# Patient Record
Sex: MALE | Race: WHITE | NOT HISPANIC OR LATINO | Employment: OTHER | URBAN - METROPOLITAN AREA
[De-identification: names, ages, dates, MRNs, and addresses within clinical notes are randomized per-mention and may not be internally consistent; named-entity substitution may affect disease eponyms.]

---

## 2021-04-09 ENCOUNTER — TRANSFERRED RECORDS (OUTPATIENT)
Dept: HEALTH INFORMATION MANAGEMENT | Facility: CLINIC | Age: 71
End: 2021-04-09

## 2021-06-21 ENCOUNTER — TRANSFERRED RECORDS (OUTPATIENT)
Dept: HEALTH INFORMATION MANAGEMENT | Facility: CLINIC | Age: 71
End: 2021-06-21

## 2021-07-06 ENCOUNTER — TRANSFERRED RECORDS (OUTPATIENT)
Dept: HEALTH INFORMATION MANAGEMENT | Facility: CLINIC | Age: 71
End: 2021-07-06

## 2021-07-07 ENCOUNTER — MEDICAL CORRESPONDENCE (OUTPATIENT)
Dept: HEALTH INFORMATION MANAGEMENT | Facility: CLINIC | Age: 71
End: 2021-07-07

## 2021-07-16 ENCOUNTER — TELEPHONE (OUTPATIENT)
Dept: GASTROENTEROLOGY | Facility: CLINIC | Age: 71
End: 2021-07-16

## 2021-07-16 ENCOUNTER — TRANSCRIBE ORDERS (OUTPATIENT)
Dept: OTHER | Age: 71
End: 2021-07-16

## 2021-07-16 DIAGNOSIS — K55.21 ANGIODYSPLASIA OF SMALL INTESTINE, EXCEPT DUODENUM WITH BLEEDING: Primary | ICD-10-CM

## 2021-07-16 NOTE — TELEPHONE ENCOUNTER
[facility] Call Center    Phone Message    May a detailed message be left on voicemail: yes     Reason for Call: Other: Patient called to schedule appt for DX: DI Bleeding from URGENT referral of Mitch Resendez NP.  Please review and follow up with patient.  Thank you!     Action Taken: Message routed to:  [facility]    Travel Screening: Not Applicable

## 2021-07-16 NOTE — TELEPHONE ENCOUNTER
REFERRAL INFORMATION:    Referring Provider:  Deepika Resendez NP     Referring Clinic: [facility]     Reason for Visit/Diagnosis: GI Bleeding     FUTURE VISIT INFORMATION:    Appointment Date: 11/2/2021    Appointment Time: 3 PM      NOTES STATUS DETAILS   OFFICE NOTE from Referring Provider Care Everywhere 7/15/2021, 7/6/2021, 6/30/2021 Office visit with BLANCA Constantino CNP     OFFICE NOTE from Other Specialist Care Everywhere 6/25/2021 Office visit with Dr. Kelley Tellez ([facility])     HOSPITAL DISCHARGE SUMMARY/  ED VISITS N/A    OPERATIVE REPORT N/A    MEDICATION LIST N/A         ENDOSCOPY  Care Everywhere EGD: 10/22/2020 ([facility])    COLONOSCOPY Care Everywhere 10/22/2020 ([facility])    ERCP N/A    EUS N/A    STOOL TESTING Care Everywhere 5/20/2021   PERTINENT LABS Care Everywhere    PATHOLOGY REPORTS (RELATED) Care Everywhere 10/22/2020   IMAGING (CT, MRI, EGD, MRCP, Small Bowel Follow Through/SBT, MR/CT Enterography) N/A

## 2021-07-19 NOTE — TELEPHONE ENCOUNTER
Patient moved up due to urgency. Currently managed by [facility] out of [facility].

## 2021-08-16 ENCOUNTER — OFFICE VISIT (OUTPATIENT)
Dept: GASTROENTEROLOGY | Facility: CLINIC | Age: 71
End: 2021-08-16
Payer: MEDICAID

## 2021-08-16 VITALS
TEMPERATURE: 99 F | WEIGHT: 252.6 LBS | HEART RATE: 82 BPM | BODY MASS INDEX: 38.28 KG/M2 | OXYGEN SATURATION: 98 % | HEIGHT: 68 IN | DIASTOLIC BLOOD PRESSURE: 64 MMHG | SYSTOLIC BLOOD PRESSURE: 136 MMHG

## 2021-08-16 DIAGNOSIS — K55.21 ANGIODYSPLASIA OF SMALL INTESTINE, EXCEPT DUODENUM WITH BLEEDING: Primary | ICD-10-CM

## 2021-08-16 PROCEDURE — 99204 OFFICE O/P NEW MOD 45 MIN: CPT | Performed by: INTERNAL MEDICINE

## 2021-08-16 RX ORDER — LANOLIN ALCOHOL/MO/W.PET/CERES
400 CREAM (GRAM) TOPICAL
COMMUNITY

## 2021-08-16 RX ORDER — BUMETANIDE 1 MG/1
TABLET ORAL
COMMUNITY
Start: 2021-08-06

## 2021-08-16 RX ORDER — FERROUS SULFATE 325(65) MG
325 TABLET ORAL
COMMUNITY
Start: 2020-10-23

## 2021-08-16 RX ORDER — ASCORBIC ACID 500 MG
500 TABLET ORAL
COMMUNITY

## 2021-08-16 RX ORDER — TIOTROPIUM BROMIDE 18 UG/1
CAPSULE ORAL; RESPIRATORY (INHALATION)
COMMUNITY
Start: 2021-06-26

## 2021-08-16 RX ORDER — ALBUTEROL SULFATE 90 UG/1
AEROSOL, METERED RESPIRATORY (INHALATION)
COMMUNITY
Start: 2021-08-09

## 2021-08-16 RX ORDER — FUROSEMIDE 20 MG
20 TABLET ORAL DAILY
COMMUNITY
Start: 2021-05-03

## 2021-08-16 RX ORDER — TAMSULOSIN HYDROCHLORIDE 0.4 MG/1
CAPSULE ORAL
COMMUNITY
Start: 2021-08-04

## 2021-08-16 RX ORDER — AMLODIPINE BESYLATE 5 MG/1
TABLET ORAL
COMMUNITY
Start: 2021-08-06

## 2021-08-16 RX ORDER — HYDRALAZINE HYDROCHLORIDE 50 MG/1
TABLET, FILM COATED ORAL
COMMUNITY
Start: 2021-08-04

## 2021-08-16 RX ORDER — FLUOXETINE 40 MG/1
CAPSULE ORAL
COMMUNITY
Start: 2021-08-06

## 2021-08-16 ASSESSMENT — MIFFLIN-ST. JEOR: SCORE: 1870.29

## 2021-08-16 NOTE — LETTER
8/16/2021         RE: Emmett Omer  [address]  [address] [facility] [address]        Dear Colleague,    Thank you for referring your patient, Emmett Omer, to the [facility] Center Line. Please see a copy of my visit note below.    [facility] Gastroenterology Consultation -  [facility]    Provider: Arpit Graff MD    PCP: Deepika Resendez NP    Assessment:  70-year-old with stage IV kidney disease possible IgA nephropathy with chronic iron deficiency anemia which worsens at times.  Recent upper endoscopy and colonoscopy did not reveal a source for chronic iron deficiency.  He recently had a capsule enteroscopy which apparently showed 2 angiodysplasias no other details available.  According to the patient these were oozing blood.  Colonoscopy demonstrated a 25 mm tubulovillous adenoma with high-grade dysplasia which was completely resected.    Recommendations:   Get outside capsule report and capsule images if possible ASAP  Tentatively schedule the patient for late August or early September to have a single balloon enteroscopy in the OR with Vladimir Gomez  Patient should have a follow-up colonoscopy at that time.    History of Present Illness:   This is a 70-year-old patient with with stage IV kidney disease associated with light chain proteinuria.  He was told this was hereditary so I assume he has been diagnosed with IgA nephropathy.  He has been iron deficient for over 3 years and more recently has been worked up for this since his hemoglobin is been dropping more precipitously at times from his baseline of around 9 down to around 7.  He is received over 11 transfusions of 2 packed cells each time.  He continues to drop his hemoglobin.  Upper endoscopy and colonoscopy did not reveal a source of blood loss.  The patient had a capsule enteroscopy which did reveal to angiectasia's in the small bowel somewhere.  I did not see the capsule report or any images.  Patient made a concerted effort  to try to get those medical records to us.  The patient suffers from chronic obstructive pulmonary disease obesity hypertension stage IV kidney disease chronic anemia.  His colonoscopy demonstrated a 2.5 cm villous adenoma with high-grade dysplasia that according to the report was completely resected.  He said he is due for another colonoscopy now.      He denies vomiting blood he denies ever seeing black stool prior to the diagnosis of the iron deficiency and being placed on iron.  Does not sound like he is receiving erythropoietin.  He says he has received B12 and this is seem to help a little bit.  He is struggling with quitting smoking all he has quit for some time he is not able to sustain it very long.     He denies abdominal pain constipation diarrhea ever turning yellow.  He has no family history of blood disorders.      Current Outpatient Medications   Medication Sig Dispense Refill     albuterol (PROAIR HFA/PROVENTIL HFA/VENTOLIN HFA) 108 (90 Base) MCG/ACT inhaler INHALE 2 PUFFS INTO THE LUNGS EVERY FOUR HOURS AS NEEDED FOR WHEEZING, SHORTNESS OF BREATH OR COUGH. SHAKE BEFORE USING.       amLODIPine (NORVASC) 5 MG tablet TAKE 2 TABLETS BY MOUTH ONCE DAILY       ANORO ELLIPTA 62.5-25 MCG/INH oral inhaler INHALE 1 PUFF BY MOUTH ONCE DAILY       aspirin (ASA) 81 MG EC tablet Take 81 mg by mouth       bumetanide (BUMEX) 1 MG tablet TAKE 1 TABLET BY MOUTH TWICE A DAY       ferrous sulfate (FEROSUL) 325 (65 Fe) MG tablet Take 325 mg by mouth       FLUoxetine (PROZAC) 40 MG capsule TAKE 1 CAPSULE BY MOUTH ONCE DAILY       folic acid (FOLVITE) 400 MCG tablet Take 400 mcg by mouth       furosemide (LASIX) 20 MG tablet Take 20 mg by mouth daily       hydrALAZINE (APRESOLINE) 50 MG tablet TAKE 1 TABLET BY MOUTH THREE TIMES DAILY       potassium 75 MG TABS Take 1 tablet by mouth every 48 hours       SPIRIVA HANDIHALER 18 MCG inhaled capsule INHALE 1 CAPSULE DAILY       tamsulosin (FLOMAX) 0.4 MG capsule TAKE 1 CAPSULE  BY MOUTH ONCE DAILY       vitamin B-12 (CYANOCOBALAMIN) 500 MCG tablet Take 500 mcg by mouth       vitamin C (ASCORBIC ACID) 500 MG tablet Take 500 mg by mouth     Problem list:  Iron deficiency anemia due to chronic blood loss 07/07/2021   Chronic bronchitis, unspecified chronic bronchitis type 07/07/2021   Stenosis of right carotid artery 06/07/2021   Class 2 severe obesity due to excess calories with serious comorbidity and body mass index (BMI) of 39.0 to 39.9 in adult 05/30/2021   Angiodysplasia of small intestine, except duodenum with bleeding 05/30/2021   Kidney disease with fluid retention 05/20/2021   Weeks's esophagus with high grade dysplasia 05/20/2021   Tubular adenoma with high-grade dysplasia 05/20/2021   Centrilobular emphysema 05/05/2021   Mucopurulent chronic bronchitis 04/29/2021   Kidney stones 03/31/2021   LVH (left ventricular hypertrophy) due to hypertensive disease, with heart failure 03/31/2021   Diastolic dysfunction 03/31/2021   CKD stage 4 due to type 2 diabetes mellitus 03/31/2021   Bilateral carotid bruits 03/31/2021   Snoring 03/31/2021   Type 2 diabetes mellitus with stage 4 chronic kidney disease, without long-term current use of insulin 03/31/2021   Basal cell carcinoma (BCC) of skin of left ear 01/16/2021   Anemia due to stage 4 chronic kidney disease 10/21/2020   Hyperlipidemia associated with type 2 diabetes mellitus 04/18/2017   Hypertension associated with diabetes 12/22/2015        Allergies   Allergen Reactions     Bee Venom Anaphylaxis     Levofloxacin Shortness Of Breath and Hives     Lisinopril Cough     Past Surgical History:   Procedure Laterality Date     COLONOSCOPY N/A 10/22/2020   repeat in one year, polyp with high grade dysplasia     DENTAL SURGERY     SKIN GRAFT   RLE to burn     UPPER GASTROINTESTINAL ENDOSCOPY N/A 10/22/2020   repeat in three years, barretts esophagus     Family History   Problem Relation Age of Onset     Other Mother   d. age 30 childbirth  "    Other Father   d. age 82 cance unknown type. all over body. radiation exposure in .     Other Brother   d. age 31 plane crash . hit bird. he ejected but hit rocks on shore.     No Known Problems Daughter     No Known Problems Son     No Known Problems Daughter     Social History     Tobacco Use     Smoking status: Former Smoker   Packs/day: 2.00   Years: 53.00   Pack years: 106.00   Types: Cigarettes   Start date: 5/10/1962   Quit date: 2021   Years since quittin.4     Smokeless tobacco: Never Used     Tobacco comment: on 21 mg nicotine patches   Vaping Use     Vaping Use: Never used   Substance Use Topics     Alcohol use: Yes   Alcohol/week: 1.0 standard drinks   Types: 1 Shots of liquor per week   Comment: \"Maybe a couple of beers a month\" [facility]     Drug use: No       reports that he has been smoking. He has never used smokeless tobacco. He reports previous alcohol use. He reports that he does not use drugs.    Physical Exam  Constitutional:       Appearance: Normal appearance. He is obese.   HENT:      Mouth/Throat:      Mouth: Mucous membranes are moist.   Eyes:      Extraocular Movements: Extraocular movements intact.      Pupils: Pupils are equal, round, and reactive to light.   Cardiovascular:      Rate and Rhythm: Normal rate.   Pulmonary:      Effort: Pulmonary effort is normal.   Abdominal:      Palpations: Abdomen is soft. There is no mass.      Tenderness: There is no abdominal tenderness.   Neurological:      Mental Status: He is alert.     Extremities: 3+ pitting edema bilateral pretibial area            Again, thank you for allowing me to participate in the care of your patient.        Sincerely,        Arpit Graff MD    "

## 2021-08-16 NOTE — NURSING NOTE
"Chief Complaint   Patient presents with     Consult     /64   Pulse 82   Temp 99  F (37.2  C) (Oral)   Ht 1.727 m (5' 8\")   Wt 114.6 kg (252 lb 9.6 oz)   SpO2 98%   BMI 38.41 kg/m   Estimated body mass index is 38.41 kg/m  as calculated from the following:    Height as of this encounter: 1.727 m (5' 8\").    Weight as of this encounter: 114.6 kg (252 lb 9.6 oz).      LUC Li  "

## 2021-08-16 NOTE — PATIENT INSTRUCTIONS
It was a pleasure taking care of you today. I've included a brief summary of our discussion and care plan from today's visit below.  Please review this information with your primary care provider.  _______________________________________________________________________    My recommendations are summarized as follows:    Recommendations:   Get outside capsule report and capsule images if possible ASAP  Tentatively schedule the patient for late August or early September to have a single balloon enteroscopy in the OR with Vladimir Saavedraau  Patient should have a follow-up colonoscopy at that time.    Return to GI Clinic in in 2 months.     If you need any follow-up appointments, please use the following phone numbers below.    To schedule or reschedule a follow-up GI appointment, call [phone] option 1    To schedule your endoscopy procedure, call [phone] option 2    To schedule imaging, please call [phone]     To schedule your lab appointment at [facility] 1st floor lab call [phone]. Call your [facility] lab directly if it is not [facility]. If you use a non-[facility] lab, please let us know where to fax your lab order (call Berenice at [phone]).      _______________________________________________________________________    Please be in touch if there are any further questions that arise following today's visit.  There are multiple ways to contact your gastroenterology care team.      During business hours, you may reach your gastroenterology RN Care Coordinator, Berenice Grier, at [phone].      You can always send a secure message through [employer]. [employer] messages are answered by your nurse or doctor typically within 24 hours. Please allow extra time on weekends and holidays.     What is Tech[employer]ith?  TechFaith is a secure way for you to access all of your healthcare records from the [facility].  It is a web based computer program, so you can sign on to it from any  location.  It also allows you to send secure messages to your care team.  I recommend signing up for [employer] access if you have not already done so and are comfortable with using a computer.     For urgent/emergent questions after business hours, you may reach the on-call GI Fellow by contacting the [facility]  at [phone].     How will I get the results of any tests ordered?    You will receive all of your results.  If you have signed up for [email or web address], any tests ordered at your visit will be available to you after your physician reviews them.  Typically this takes 1-2 weeks.  If there are urgent results that require a change in your care plan, your physician or nurse will call you to discuss the next steps.      Thank you for choosing [facility]!       Sincerely,    Arpit Graff MD  [facility]  Division of Gastroenterology

## 2021-08-17 ENCOUNTER — TELEPHONE (OUTPATIENT)
Dept: GASTROENTEROLOGY | Facility: CLINIC | Age: 71
End: 2021-08-17

## 2021-08-17 NOTE — TELEPHONE ENCOUNTER
Advanced Endoscopy     Referring provider:  Dr Arpit Graff    Referred to: [facility]     Provider Requested:  Dr. Gomez     Referral Received: 8/17/21     Records received: some in CareEverywhere     Images received: capsule study not received    Evaluation for: GIB- Angiodysplasia of small intestine, except duodenum with bleeding      Clinical History (per RN review):     Report and images requested from [facility], left message.      MD review date:   MD Decision for clinic consultation/Orders:            Referral updates/Patient contacted:  8/17- told pt were working on referral, requesting records/images, pt appreciative of call,   8-26, still have no records, updated patient. Called [facility] to get images/reports sent again. Endocapsule done 6/21 at [facility] per CE.     9/23- Deepika Nirav has printed off his capsule images due to all of the back and forth going on and she can mail them to us.   [phone]. Left message with address to mail images to.

## 2021-08-17 NOTE — PROGRESS NOTES
[facility] Gastroenterology Consultation [facility]    Provider: Arpit Graff MD    PCP: Deepika Resendez NP    Assessment:  70-year-old with stage IV kidney disease possible IgA nephropathy with chronic iron deficiency anemia which worsens at times.  Recent upper endoscopy and colonoscopy did not reveal a source for chronic iron deficiency.  He recently had a capsule enteroscopy which apparently showed 2 angiodysplasias no other details available.  According to the patient these were oozing blood.  Colonoscopy demonstrated a 25 mm tubulovillous adenoma with high-grade dysplasia which was completely resected.    Recommendations:   Get outside capsule report and capsule images if possible ASAP  Tentatively schedule the patient for late August or early September to have a single balloon enteroscopy in the OR with Vladimir Gomez  Patient should have a follow-up colonoscopy at that time.    History of Present Illness:   This is a 70-year-old patient with with stage IV kidney disease associated with light chain proteinuria.  He was told this was hereditary so I assume he has been diagnosed with IgA nephropathy.  He has been iron deficient for over 3 years and more recently has been worked up for this since his hemoglobin is been dropping more precipitously at times from his baseline of around 9 down to around 7.  He is received over 11 transfusions of 2 packed cells each time.  He continues to drop his hemoglobin.  Upper endoscopy and colonoscopy did not reveal a source of blood loss.  The patient had a capsule enteroscopy which did reveal to angiectasia's in the small bowel somewhere.  I did not see the capsule report or any images.  Patient made a concerted effort to try to get those medical records to us.  The patient suffers from chronic obstructive pulmonary disease obesity hypertension stage IV kidney disease chronic anemia.  His colonoscopy demonstrated a 2.5 cm villous adenoma with high-grade dysplasia that  according to the report was completely resected.  He said he is due for another colonoscopy now.      He denies vomiting blood he denies ever seeing black stool prior to the diagnosis of the iron deficiency and being placed on iron.  Does not sound like he is receiving erythropoietin.  He says he has received B12 and this is seem to help a little bit.  He is struggling with quitting smoking all he has quit for some time he is not able to sustain it very long.     He denies abdominal pain constipation diarrhea ever turning yellow.  He has no family history of blood disorders.      Current Outpatient Medications   Medication Sig Dispense Refill     albuterol (PROAIR HFA/PROVENTIL HFA/VENTOLIN HFA) 108 (90 Base) MCG/ACT inhaler INHALE 2 PUFFS INTO THE LUNGS EVERY FOUR HOURS AS NEEDED FOR WHEEZING, SHORTNESS OF BREATH OR COUGH. SHAKE BEFORE USING.       amLODIPine (NORVASC) 5 MG tablet TAKE 2 TABLETS BY MOUTH ONCE DAILY       ANORO ELLIPTA 62.5-25 MCG/INH oral inhaler INHALE 1 PUFF BY MOUTH ONCE DAILY       aspirin (ASA) 81 MG EC tablet Take 81 mg by mouth       bumetanide (BUMEX) 1 MG tablet TAKE 1 TABLET BY MOUTH TWICE A DAY       ferrous sulfate (FEROSUL) 325 (65 Fe) MG tablet Take 325 mg by mouth       FLUoxetine (PROZAC) 40 MG capsule TAKE 1 CAPSULE BY MOUTH ONCE DAILY       folic acid (FOLVITE) 400 MCG tablet Take 400 mcg by mouth       furosemide (LASIX) 20 MG tablet Take 20 mg by mouth daily       hydrALAZINE (APRESOLINE) 50 MG tablet TAKE 1 TABLET BY MOUTH THREE TIMES DAILY       potassium 75 MG TABS Take 1 tablet by mouth every 48 hours       SPIRIVA HANDIHALER 18 MCG inhaled capsule INHALE 1 CAPSULE DAILY       tamsulosin (FLOMAX) 0.4 MG capsule TAKE 1 CAPSULE BY MOUTH ONCE DAILY       vitamin B-12 (CYANOCOBALAMIN) 500 MCG tablet Take 500 mcg by mouth       vitamin C (ASCORBIC ACID) 500 MG tablet Take 500 mg by mouth     Problem list:  Iron deficiency anemia due to chronic blood loss 07/07/2021   Chronic  bronchitis, unspecified chronic bronchitis type 07/07/2021   Stenosis of right carotid artery 06/07/2021   Class 2 severe obesity due to excess calories with serious comorbidity and body mass index (BMI) of 39.0 to 39.9 in adult 05/30/2021   Angiodysplasia of small intestine, except duodenum with bleeding 05/30/2021   Kidney disease with fluid retention 05/20/2021   Weeks's esophagus with high grade dysplasia 05/20/2021   Tubular adenoma with high-grade dysplasia 05/20/2021   Centrilobular emphysema 05/05/2021   Mucopurulent chronic bronchitis 04/29/2021   Kidney stones 03/31/2021   LVH (left ventricular hypertrophy) due to hypertensive disease, with heart failure 03/31/2021   Diastolic dysfunction 03/31/2021   CKD stage 4 due to type 2 diabetes mellitus 03/31/2021   Bilateral carotid bruits 03/31/2021   Snoring 03/31/2021   Type 2 diabetes mellitus with stage 4 chronic kidney disease, without long-term current use of insulin 03/31/2021   Basal cell carcinoma (BCC) of skin of left ear 01/16/2021   Anemia due to stage 4 chronic kidney disease 10/21/2020   Hyperlipidemia associated with type 2 diabetes mellitus 04/18/2017   Hypertension associated with diabetes 12/22/2015        Allergies   Allergen Reactions     Bee Venom Anaphylaxis     Levofloxacin Shortness Of Breath and Hives     Lisinopril Cough     Past Surgical History:   Procedure Laterality Date     COLONOSCOPY N/A 10/22/2020   repeat in one year, polyp with high grade dysplasia     DENTAL SURGERY     SKIN GRAFT   RLE to burn     UPPER GASTROINTESTINAL ENDOSCOPY N/A 10/22/2020   repeat in three years, barretts esophagus     Family History   Problem Relation Age of Onset     Other Mother   d. age 30 childbirth     Other Father   d. age 82 cance unknown type. all over body. radiation exposure in .     Other Brother   d. age 31 plane crash . hit bird. he ejected but hit rocks on shore.     No Known Problems Daughter     No Known Problems  "Son     No Known Problems Daughter     Social History     Tobacco Use     Smoking status: Former Smoker   Packs/day: 2.00   Years: 53.00   Pack years: 106.00   Types: Cigarettes   Start date: 5/10/1962   Quit date: 2021   Years since quittin.4     Smokeless tobacco: Never Used     Tobacco comment: on 21 mg nicotine patches   Vaping Use     Vaping Use: Never used   Substance Use Topics     Alcohol use: Yes   Alcohol/week: 1.0 standard drinks   Types: 1 Shots of liquor per week   Comment: \"Maybe a couple of beers a month\" [place]     Drug use: No       reports that he has been smoking. He has never used smokeless tobacco. He reports previous alcohol use. He reports that he does not use drugs.    Physical Exam  Constitutional:       Appearance: Normal appearance. He is obese.   HENT:      Mouth/Throat:      Mouth: Mucous membranes are moist.   Eyes:      Extraocular Movements: Extraocular movements intact.      Pupils: Pupils are equal, round, and reactive to light.   Cardiovascular:      Rate and Rhythm: Normal rate.   Pulmonary:      Effort: Pulmonary effort is normal.   Abdominal:      Palpations: Abdomen is soft. There is no mass.      Tenderness: There is no abdominal tenderness.   Neurological:      Mental Status: He is alert.     Extremities: 3+ pitting edema bilateral pretibial area        "

## 2021-08-25 ENCOUNTER — PATIENT OUTREACH (OUTPATIENT)
Dept: GASTROENTEROLOGY | Facility: CLINIC | Age: 71
End: 2021-08-25

## 2021-08-25 NOTE — TELEPHONE ENCOUNTER
Returned call to Deepika from referring office, she wants to know when patient will be scheduled for endoscopy. Have not received capsule endoscopy images yet, per Dr. Gomez need to review those prior to scheduling patient for procedure. Left message with clinic number and fax #.    ML

## 2021-09-16 ENCOUNTER — PATIENT OUTREACH (OUTPATIENT)
Dept: GASTROENTEROLOGY | Facility: CLINIC | Age: 71
End: 2021-09-16

## 2021-09-16 NOTE — PROGRESS NOTES
Returned call to Deepika in response to call center message received,     Deepika Resendez NP, called to check in on when this patient will be getting scheduled. They are concerned since his HGB keeps dropping.   Deepika states that someone from [facility] told the patient that the capsule study was in the patient's record at [facility].     Message routed to Dr Gomez     I usually create a document with my interpretation and forward this to Michelle   I dont have any capsules yet to read    Will check with records dept to see if capsule study was received    If capsule study needs to be requested again Deepika says to call [phone] GI dept at [facility]    Lora Nelson, RN, BSN,   [facility]  Care coordinator

## 2021-10-07 ENCOUNTER — PATIENT OUTREACH (OUTPATIENT)
Dept: GASTROENTEROLOGY | Facility: CLINIC | Age: 71
End: 2021-10-07

## 2021-10-07 DIAGNOSIS — K55.21 ANGIODYSPLASIA OF SMALL INTESTINE, EXCEPT DUODENUM WITH BLEEDING: Primary | ICD-10-CM

## 2021-10-07 NOTE — TELEPHONE ENCOUNTER
Returned call to Deepika, referring NP    Pt w/ persistent GIB, Hgb 6.2 again today. We are having immense difficulty getting capsule study film or images here for our review. Referring provider anxious to get the plan moving, I explained the importance of getting capsule images prior to scheduling procedure.    Pt is agreeable to repeat capsule study down here- orders placed, message sent to scheduling, providers updated.    ML

## 2021-10-08 NOTE — TELEPHONE ENCOUNTER
Called referring provider, Deepika- explained capsule and procedure cannot be done on the same day. Per Dr. Gomez    Unfortunately no   We are doing the capsule to understand what to target   Capsule takes 8h to capture and then 24+ to interpret   Then the patient may need a prep for the endoscopy     Left message w/ Deepika of above.    ML

## 2021-10-12 NOTE — TELEPHONE ENCOUNTER
Called NP to see how long patient could consider being down here as we try to cluster and organize care. Pt needs repeat capsule study here followed by +/- enteroscopy with Dr. Gomez. Per Dr. Gomez    Capsule takes 8h to capture and then 24+ to interpret   Then the patient may need a prep for the endoscopy     Discussed plan with patient- coming for 5 days vs coming for 2 trips (capsule then procedure) He prefers coming for 2 trips. Message sent to endo scheduling to facilitate asap capsule study.    ML

## 2021-10-29 NOTE — TELEPHONE ENCOUNTER
Per Dr. Gomez  single balloon per os r/t GIB      Please assist in scheduling:     Procedure/Imaging/Clinic: enteroscopy  Physician: Dr. Gomez  Timin/10  Procedure length:50 min  Anesthesia:gen  Dx: GIB  Tier:2  Location: [facility]     Pt was getting blood transfusion, asked that I call him back later. Returned call.         Called to discuss with patient. Explained they can expect a call from  for date and time of procedure, will need a , someone to stay with them for 24 hours and should stay in town for 24 hours (within 45 min of Hospital) post procedure    Patient needs to get pre-op physical completed. If outside [facility] system will need physical faxed to number [phone]   If you do not get a preop physical, your procedure could be cancelled, patient voiced understanding*    Preop Plan:see preop note from 10-8    Med Review    Blood thinner -  no  ASA - no  Diabetic - no    COVID test discussed:will get done     Patient Education r/t procedure:done    Does patient have any history of gastric bypass/gastric surgery/altered panc/bili anatomy?no    A pre-op nurse will call 1-2 days prior to the procedure. I    NPO/Prep: CLD 24 hours day prior, 2 doses of Miralax night before, CLD ok until arrival    Other specific details/comments:none     Verbalized understanding of all instructions. All questions answered.          ML

## 2021-11-01 ENCOUNTER — PREP FOR PROCEDURE (OUTPATIENT)
Dept: GASTROENTEROLOGY | Facility: CLINIC | Age: 71
End: 2021-11-01

## 2021-11-01 DIAGNOSIS — K92.2 GIB (GASTROINTESTINAL BLEEDING): Primary | ICD-10-CM

## 2021-11-01 DIAGNOSIS — Z11.59 ENCOUNTER FOR SCREENING FOR OTHER VIRAL DISEASES: ICD-10-CM

## 2021-11-02 ENCOUNTER — PRE VISIT (OUTPATIENT)
Dept: GASTROENTEROLOGY | Facility: CLINIC | Age: 71
End: 2021-11-02

## 2021-11-03 ENCOUNTER — TRANSFERRED RECORDS (OUTPATIENT)
Dept: HEALTH INFORMATION MANAGEMENT | Facility: CLINIC | Age: 71
End: 2021-11-03

## 2021-11-05 ENCOUNTER — PATIENT OUTREACH (OUTPATIENT)
Dept: GASTROENTEROLOGY | Facility: CLINIC | Age: 71
End: 2021-11-05

## 2021-11-05 ENCOUNTER — PREP FOR PROCEDURE (OUTPATIENT)
Dept: GASTROENTEROLOGY | Facility: CLINIC | Age: 71
End: 2021-11-05
Payer: MEDICAID

## 2021-11-05 DIAGNOSIS — K92.2 GIB (GASTROINTESTINAL BLEEDING): Primary | ICD-10-CM

## 2021-11-05 RX ORDER — BISACODYL 5 MG/1
TABLET, DELAYED RELEASE ORAL
Qty: 4 TABLET | Refills: 0 | Status: SHIPPED | OUTPATIENT
Start: 2021-11-05 | End: 2021-11-05

## 2021-11-05 RX ORDER — POLYETHYLENE GLYCOL 3350 17 G/17G
POWDER, FOR SOLUTION ORAL
Qty: 238 G | Refills: 0 | Status: SHIPPED | OUTPATIENT
Start: 2021-11-05

## 2021-11-05 RX ORDER — POLYETHYLENE GLYCOL 3350 17 G/17G
POWDER, FOR SOLUTION ORAL
Qty: 238 G | Refills: 0 | Status: SHIPPED | OUTPATIENT
Start: 2021-11-05 | End: 2021-11-05

## 2021-11-05 RX ORDER — BISACODYL 5 MG/1
TABLET, DELAYED RELEASE ORAL
Qty: 4 TABLET | Refills: 0 | Status: SHIPPED | OUTPATIENT
Start: 2021-11-05

## 2021-11-05 NOTE — TELEPHONE ENCOUNTER
After team discussion, will add colonoscopy to enteroscopy     Case requested, colonoscopy added to procedure. Meds ordered to preferred pharmacy- prep directions sent to email.    [email or web address]    How to Prepare for Your Procedure Using Split-Dose Miralax   A colonoscopy is a test that lets the doctor see inside the colon (large intestine). During the inside of the colon is closely visualized on a video monitor. During this procedure the doctor may take photos of the colon lining, remove small tissue samples (biopsies), and remove polyps (growths that may have the potential to become colon cancer if not removed).     It is critical that the bowel is completely cleaned out for the procedure. Residual stool in the colon may obstruct the view or clog up the colonoscope, which can result in a less than adequate examination and a need to repeat the exam. It is important that you follow all the instruction steps before your exam. While this set of instructions yields excellent results in the majority of patients, some patients (such as those with history of constipation, for example) require more a more intensive cleansing regimen. If you have any questions, please call our nurse line at [phone].       Getting ready     A nurse will call you about 1 week before your exam to review prep instructions with you.     Ask about your medications   You may need to stop taking some of your medications before your procedure. Talk to your doctor as your prescription or schedule may need to change before the test. The following are common examples:       Blood thinners   These medicines (such as Coumadin, Plavix, Eliquis, Xarelto, Lovenox, and others) are used to treat or prevent blood clots, heart attacks, and strokes. Ask the doctor who prescribes this medication for you when to stop taking it. You can continue taking aspirin.       Medications for diabetes   If you take insulin or other medications for diabetes,  you may need to change the dose. Ask the doctor who prescribes the medication what you should do the day before the procedure because you will be on a clear liquid diet. Ask to have your exam early in the morning.         7 days before the exam:     Stop taking fiber and iron supplements     Stop eating nuts and foods that contain seeds. These can stay in the colon for many days and they can clog up the colonoscope.     Go to the drug store and buy:     *Four (4) Dulcolax (Bisacodyl) tablets     *One 8.3 ounce bottle of Miralax     *Four (4) Simethicone (Mylicon) gas relief tablets     *64 ounces of Gatorade (not red or purple)     *10 once bottle of clear Magnesium Citrate       3 days before the exam:     Begin a low-fiber diet (see attached information on a low fiber diet).     Drink at least 4 to 6 large glasses of sports drink (not red or purple) each day.       One day before the exam:     Only clear liquid diet is allowed (see attached list). Drink at least 8 to 10 full glasses of clear liquids during the day.     You will start drinking the colonoscopy prep solution at ~ 5 PM. The solution is taken in two steps. Note that the second step is ideally taken ~ 6 hours before the procedure; therefore, the timing of this step depends on your appointment time for the examination.     It is very important to drink the solution quickly because that generates the necessary flush through the intestine.     Once you start drinking the solution, stay near a toilet while using this medicine. Besides diarrhea (watery stools), you may have mild cramping, bloating and nausea.       STEP ONE     At 4 pm, take 2 Dulcolax (Bisacodyl) tablets.     At 4 pm, mix the entire bottle of Miralax with 64 ounces of Gatorade in a pitcher and stir to dissolve the powder. Chill if desired, but do not add ice.     At 5 pm, start drinking an 8-ounce glass of the Miralax and Gatorade mixture every 15 minutes until the pitcher is half empty  (about 4 glasses).  Store the rest in the refrigerator.     Bowel movements usually begin about one hour after your finish this first dose of Miralax. The stool is likely to be brown at this stage.       You can put some petroleum jelly (Vaseline) on the skin around your anus after each bowel movement to prevent irritation. You can also use wet wipes.     Continue to drink clear liquids.       STEP TWO (If you were told to arrive for your colonoscopy before 11 AM)     At 11 PM take 2 Dulcolax (Bisacodyl) tablets.     At 11 PM start drinking an 8-ounce glass of Miralax and Gatorade mixture every 15 minutes until the pitcher is empty (about 4 glasses).     Take four Simethicone (Mylicon) gas relief tablets after the prep is finished.   Drink 10 ounces of clear Magnesium Citrate 6 hours prior to your scheduled arrival to the endoscopy unit.     STEP TWO (If you were told to arrive for your colonoscopy after 11 AM)     At 6 AM on the day of the exam take 2 Dulcolax (Bisacodyl) tablets.     At 6 AM on the day of the exam start drinking an 8-ounce glass of Miralax and Gatorade mixture every 15 minutes until the pitcher is empty (about 4 glasses).     Take four Simethicone (Mylicon) gas relief tablets after the prep is finished     Drink 10 ounces of clear Magnesium Citrate 6 hours prior to your scheduled arrival to the endoscopy unit.       Day of exam:     If you must take medicine, you may take it with sips of water. Do not take diabetes medicine by mouth until after your exam.     Drinking of liquids, including the colonoscopy prep solution, should not continue beyond 4 hours before the procedure.       Do not chew or swallow anything including water or gum for at least 4 hours before your     colonoscopy. This is a safety issue, and we may need to cancel your exam if you do not observe this policy.     If you have asthma: Bring your inhaler with you.     Please arrive with an adult who can take you home after the  test: the medicine used will make you sleepy. If you do not have someone to drive you home, we may cancel your test.         Frequently Asked Questions:         Why should Miralax be mixed with Gatorade? It is important that your body does not develop an imbalance of electrolytes with the large volume of fluid in this prep. Gatorade contains those electrolytes.     Why should the Miralax prep be taken in several steps? The stool is flushed out by a large wave of fluid going through the colon. Just sipping a large volume of the solution will not achieve the desired result. Studies have shown that two smaller waves (or more in some cases) are better than one large one.       Why are the second Miralax dose and Magnesium Citrate so close to the exam? The intestine continues to produce mucus and waste. Longer intervals between the prep and the exam can lead to less than desired results. However, the stomach must be empty at the time of the exam in order to allow safe sedation. Therefore, there should be nothing by mouth 4 hours before the exam is started.       Clear liquid diet       You may have:       Water, tea, coffee (no cream)         Soda pop, Gatorade (not red or purple)       Clear nutrition drinks (Enlive, Resource Breeze)       Jell-O, Popsicles (no milk or fruit pieces) or sorbet (not red or purple)       Fat-free soup broth or bouillon       Plain hard candy, such as clear life savers (not red or purple)       Clear juices and fruit-flavored drinks such as apple juice, white grape juice, Hi-C and Ken-Aid       (not red or purple)       Do not have:       Milk or milk products such as ice cream, malts or shakes       Red or purple drinks of any kind such as cranberry juice or grape juice. Avoid red or purple Jell-O, Popsicles, Ken-Aid, sorbet and candy       Juices with pulp such as orange, grapefruit, pineapple or tomato juice       Cream soups of any kind       Alcohol       Low-fiber Diet       You may  "have:       Starches: White bread, rolls, biscuits, croissants, [place] toast, white flour tortillas, waffles, pancakes, German toast; white rice, noodles, pasta, macaroni; cooked and peeled potatoes; plain crackers, saltines; cooked farina or cream of rice; puffed rice, corn flakes, Rice Krispies, Special K     Vegetables: vegetable broths     Fruits and fruit juices: Strained fruit juice, canned fruit without seeds or skin (not pineapple), applesauce, pear sauce, ripe bananas, melons (not watermelon)   Milk products: Milk (plain or flavored), cheese, cottage cheese, yogurt (no berries), custard, ice cream       Proteins: Tender, well-cooked ground beef, lamb, veal, ham, pork, chicken, turkey, fish or organ meats; eggs; creamy peanut butter     Fats and condiments:  Margarine, butter, oils, mayonnaise, sour cream, salad dressing, plain gravy; spices, cooked herbs; sugar, clear jelly, honey, syrup     Snacks, sweets and drinks: Pretzels, hard candy; plain cakes and cookies (no nuts or seeds); gelatin, plain pudding, sherbet, Popsicles; coffee, tea, carbonated (\"fizzy\") drinks           Do not have:   Starches: Breads or rolls that contain nuts, seeds or fruit; whole wheat or whole grain breads that contain more than 1 gram of fiber per slice; cornbread; corn or whole wheat tortillas; potatoes with skin; brown rice, wild rice, kasha (buckwheat)     Vegetables: Any raw or steamed vegetables; vegetables with seeds; corn in any form     Fruits and fruit juices: Prunes, prune juice, raisins and other dried fruits, berries and other fruits with seeds, canned pineapple     Milk products: Any yogurt with nuts, seeds or berries     Proteins: Tough, fibrous meats with gristle; cooked dried beans, peas or lentils; crunchy peanut butter     Fats and condiments: Pickles, olives, relish, horseradish; jam, marmalade, preserves     Snacks, sweets and drinks: Popcorn, nuts, seeds, granola, coconut, candies made with nuts or seeds; all " desserts that contain nuts, seeds, raisins and other dried fruits, coconut, whole grains or bran.

## 2021-11-09 ENCOUNTER — ANESTHESIA EVENT (OUTPATIENT)
Dept: SURGERY | Facility: CLINIC | Age: 71
End: 2021-11-09
Payer: MEDICAID

## 2021-11-10 ENCOUNTER — ANESTHESIA (OUTPATIENT)
Dept: SURGERY | Facility: CLINIC | Age: 71
End: 2021-11-10
Payer: MEDICAID

## 2021-11-10 ENCOUNTER — HOSPITAL ENCOUNTER (OUTPATIENT)
Facility: CLINIC | Age: 71
Discharge: HOME | End: 2021-11-10
Attending: INTERNAL MEDICINE | Admitting: INTERNAL MEDICINE
Payer: MEDICAID

## 2021-11-10 VITALS
SYSTOLIC BLOOD PRESSURE: 159 MMHG | OXYGEN SATURATION: 92 % | TEMPERATURE: 98 F | HEART RATE: 72 BPM | HEIGHT: 68 IN | RESPIRATION RATE: 16 BRPM | WEIGHT: 254.41 LBS | BODY MASS INDEX: 38.56 KG/M2 | DIASTOLIC BLOOD PRESSURE: 77 MMHG

## 2021-11-10 DIAGNOSIS — K92.2 GIB (GASTROINTESTINAL BLEEDING): ICD-10-CM

## 2021-11-10 LAB
COLONOSCOPY: NORMAL
CREAT SERPL-MCNC: 3.23 MG/DL (ref 0.66–1.25)
GFR SERPL CREATININE-BSD FRML MDRD: 18 ML/MIN/1.73M2
GLUCOSE BLDC GLUCOMTR-MCNC: 111 MG/DL (ref 70–99)
HGB BLD-MCNC: 9.4 G/DL (ref 13.3–17.7)
PLATELET # BLD AUTO: 308 10E3/UL (ref 150–450)
POTASSIUM BLD-SCNC: 3.9 MMOL/L (ref 3.4–5.3)
SMALL BOWEL ENTEROSCOPY: NORMAL

## 2021-11-10 PROCEDURE — 250N000009 HC RX 250: Performed by: INTERNAL MEDICINE

## 2021-11-10 PROCEDURE — 258N000003 HC RX IP 258 OP 636: Performed by: NURSE ANESTHETIST, CERTIFIED REGISTERED

## 2021-11-10 PROCEDURE — 82962 GLUCOSE BLOOD TEST: CPT

## 2021-11-10 PROCEDURE — 88305 TISSUE EXAM BY PATHOLOGIST: CPT | Mod: TC | Performed by: INTERNAL MEDICINE

## 2021-11-10 PROCEDURE — 272N000001 HC OR GENERAL SUPPLY STERILE: Performed by: INTERNAL MEDICINE

## 2021-11-10 PROCEDURE — 250N000025 HC SEVOFLURANE, PER MIN: Performed by: INTERNAL MEDICINE

## 2021-11-10 PROCEDURE — 36415 COLL VENOUS BLD VENIPUNCTURE: CPT | Performed by: ANESTHESIOLOGY

## 2021-11-10 PROCEDURE — 999N000141 HC STATISTIC PRE-PROCEDURE NURSING ASSESSMENT: Performed by: INTERNAL MEDICINE

## 2021-11-10 PROCEDURE — 85049 AUTOMATED PLATELET COUNT: CPT | Performed by: ANESTHESIOLOGY

## 2021-11-10 PROCEDURE — 250N000009 HC RX 250: Performed by: NURSE ANESTHETIST, CERTIFIED REGISTERED

## 2021-11-10 PROCEDURE — 710N000010 HC RECOVERY PHASE 1, LEVEL 2, PER MIN: Performed by: INTERNAL MEDICINE

## 2021-11-10 PROCEDURE — 85018 HEMOGLOBIN: CPT | Performed by: ANESTHESIOLOGY

## 2021-11-10 PROCEDURE — 250N000011 HC RX IP 250 OP 636: Performed by: NURSE ANESTHETIST, CERTIFIED REGISTERED

## 2021-11-10 PROCEDURE — 250N000009 HC RX 250: Performed by: ANESTHESIOLOGY

## 2021-11-10 PROCEDURE — 710N000012 HC RECOVERY PHASE 2, PER MINUTE: Performed by: INTERNAL MEDICINE

## 2021-11-10 PROCEDURE — 88305 TISSUE EXAM BY PATHOLOGIST: CPT | Mod: 26 | Performed by: PATHOLOGY

## 2021-11-10 PROCEDURE — 370N000017 HC ANESTHESIA TECHNICAL FEE, PER MIN: Performed by: INTERNAL MEDICINE

## 2021-11-10 PROCEDURE — 360N000083 HC SURGERY LEVEL 3 W/ FLUORO, PER MIN: Performed by: INTERNAL MEDICINE

## 2021-11-10 PROCEDURE — 84132 ASSAY OF SERUM POTASSIUM: CPT | Performed by: ANESTHESIOLOGY

## 2021-11-10 PROCEDURE — 250N000011 HC RX IP 250 OP 636: Performed by: INTERNAL MEDICINE

## 2021-11-10 PROCEDURE — 82565 ASSAY OF CREATININE: CPT | Performed by: ANESTHESIOLOGY

## 2021-11-10 RX ORDER — HYDRALAZINE HYDROCHLORIDE 20 MG/ML
2.5-5 INJECTION INTRAMUSCULAR; INTRAVENOUS EVERY 10 MIN PRN
Status: DISCONTINUED | OUTPATIENT
Start: 2021-11-10 | End: 2021-11-10 | Stop reason: HOSPADM

## 2021-11-10 RX ORDER — METOPROLOL TARTRATE 1 MG/ML
1-2 INJECTION, SOLUTION INTRAVENOUS EVERY 5 MIN PRN
Status: DISCONTINUED | OUTPATIENT
Start: 2021-11-10 | End: 2021-11-10 | Stop reason: HOSPADM

## 2021-11-10 RX ORDER — OXYCODONE HYDROCHLORIDE 5 MG/1
5 TABLET ORAL EVERY 4 HOURS PRN
Status: DISCONTINUED | OUTPATIENT
Start: 2021-11-10 | End: 2021-11-10 | Stop reason: HOSPADM

## 2021-11-10 RX ORDER — LIDOCAINE HYDROCHLORIDE 20 MG/ML
INJECTION, SOLUTION INFILTRATION; PERINEURAL PRN
Status: DISCONTINUED | OUTPATIENT
Start: 2021-11-10 | End: 2021-11-10

## 2021-11-10 RX ORDER — DEXAMETHASONE SODIUM PHOSPHATE 4 MG/ML
INJECTION, SOLUTION INTRA-ARTICULAR; INTRALESIONAL; INTRAMUSCULAR; INTRAVENOUS; SOFT TISSUE PRN
Status: DISCONTINUED | OUTPATIENT
Start: 2021-11-10 | End: 2021-11-10

## 2021-11-10 RX ORDER — ONDANSETRON 2 MG/ML
4 INJECTION INTRAMUSCULAR; INTRAVENOUS
Status: COMPLETED | OUTPATIENT
Start: 2021-11-10 | End: 2021-11-10

## 2021-11-10 RX ORDER — ALBUTEROL SULFATE 0.83 MG/ML
2.5 SOLUTION RESPIRATORY (INHALATION) EVERY 4 HOURS PRN
Status: DISCONTINUED | OUTPATIENT
Start: 2021-11-10 | End: 2021-11-10 | Stop reason: HOSPADM

## 2021-11-10 RX ORDER — ONDANSETRON 2 MG/ML
4 INJECTION INTRAMUSCULAR; INTRAVENOUS EVERY 30 MIN PRN
Status: DISCONTINUED | OUTPATIENT
Start: 2021-11-10 | End: 2021-11-10 | Stop reason: HOSPADM

## 2021-11-10 RX ORDER — LIDOCAINE 40 MG/G
CREAM TOPICAL
Status: DISCONTINUED | OUTPATIENT
Start: 2021-11-10 | End: 2021-11-10 | Stop reason: HOSPADM

## 2021-11-10 RX ORDER — SODIUM CHLORIDE, SODIUM LACTATE, POTASSIUM CHLORIDE, CALCIUM CHLORIDE 600; 310; 30; 20 MG/100ML; MG/100ML; MG/100ML; MG/100ML
INJECTION, SOLUTION INTRAVENOUS CONTINUOUS PRN
Status: DISCONTINUED | OUTPATIENT
Start: 2021-11-10 | End: 2021-11-10

## 2021-11-10 RX ORDER — ALBUTEROL SULFATE 0.83 MG/ML
2.5 SOLUTION RESPIRATORY (INHALATION) EVERY 6 HOURS PRN
Status: DISCONTINUED | OUTPATIENT
Start: 2021-11-10 | End: 2021-11-10 | Stop reason: HOSPADM

## 2021-11-10 RX ORDER — SODIUM CHLORIDE, SODIUM LACTATE, POTASSIUM CHLORIDE, CALCIUM CHLORIDE 600; 310; 30; 20 MG/100ML; MG/100ML; MG/100ML; MG/100ML
INJECTION, SOLUTION INTRAVENOUS CONTINUOUS
Status: DISCONTINUED | OUTPATIENT
Start: 2021-11-10 | End: 2021-11-10 | Stop reason: HOSPADM

## 2021-11-10 RX ORDER — FENTANYL CITRATE 50 UG/ML
INJECTION, SOLUTION INTRAMUSCULAR; INTRAVENOUS PRN
Status: DISCONTINUED | OUTPATIENT
Start: 2021-11-10 | End: 2021-11-10

## 2021-11-10 RX ORDER — HYDROMORPHONE HCL IN WATER/PF 6 MG/30 ML
0.2 PATIENT CONTROLLED ANALGESIA SYRINGE INTRAVENOUS EVERY 5 MIN PRN
Status: DISCONTINUED | OUTPATIENT
Start: 2021-11-10 | End: 2021-11-10 | Stop reason: HOSPADM

## 2021-11-10 RX ORDER — ONDANSETRON 4 MG/1
4 TABLET, ORALLY DISINTEGRATING ORAL EVERY 30 MIN PRN
Status: DISCONTINUED | OUTPATIENT
Start: 2021-11-10 | End: 2021-11-10 | Stop reason: HOSPADM

## 2021-11-10 RX ORDER — PROPOFOL 10 MG/ML
INJECTION, EMULSION INTRAVENOUS PRN
Status: DISCONTINUED | OUTPATIENT
Start: 2021-11-10 | End: 2021-11-10

## 2021-11-10 RX ORDER — FENTANYL CITRATE 50 UG/ML
25 INJECTION, SOLUTION INTRAMUSCULAR; INTRAVENOUS EVERY 5 MIN PRN
Status: DISCONTINUED | OUTPATIENT
Start: 2021-11-10 | End: 2021-11-10 | Stop reason: HOSPADM

## 2021-11-10 RX ADMIN — FENTANYL CITRATE 50 MCG: 50 INJECTION, SOLUTION INTRAMUSCULAR; INTRAVENOUS at 10:59

## 2021-11-10 RX ADMIN — PROPOFOL 30 MG: 10 INJECTION, EMULSION INTRAVENOUS at 12:31

## 2021-11-10 RX ADMIN — PROPOFOL 30 MG: 10 INJECTION, EMULSION INTRAVENOUS at 11:01

## 2021-11-10 RX ADMIN — FENTANYL CITRATE 50 MCG: 50 INJECTION, SOLUTION INTRAMUSCULAR; INTRAVENOUS at 11:00

## 2021-11-10 RX ADMIN — SUGAMMADEX 200 MG: 100 INJECTION, SOLUTION INTRAVENOUS at 12:37

## 2021-11-10 RX ADMIN — Medication 100 MG: at 11:00

## 2021-11-10 RX ADMIN — PHENYLEPHRINE HYDROCHLORIDE 100 MCG: 10 INJECTION INTRAVENOUS at 12:00

## 2021-11-10 RX ADMIN — SODIUM CHLORIDE, POTASSIUM CHLORIDE, SODIUM LACTATE AND CALCIUM CHLORIDE: 600; 310; 30; 20 INJECTION, SOLUTION INTRAVENOUS at 10:52

## 2021-11-10 RX ADMIN — ROCURONIUM BROMIDE 40 MG: 50 INJECTION, SOLUTION INTRAVENOUS at 11:13

## 2021-11-10 RX ADMIN — PHENYLEPHRINE HYDROCHLORIDE 100 MCG: 10 INJECTION INTRAVENOUS at 11:51

## 2021-11-10 RX ADMIN — ONDANSETRON 4 MG: 2 INJECTION INTRAMUSCULAR; INTRAVENOUS at 12:33

## 2021-11-10 RX ADMIN — PROPOFOL 150 MG: 10 INJECTION, EMULSION INTRAVENOUS at 11:00

## 2021-11-10 RX ADMIN — DEXAMETHASONE SODIUM PHOSPHATE 10 MG: 4 INJECTION, SOLUTION INTRA-ARTICULAR; INTRALESIONAL; INTRAMUSCULAR; INTRAVENOUS; SOFT TISSUE at 11:10

## 2021-11-10 RX ADMIN — SODIUM CHLORIDE, POTASSIUM CHLORIDE, SODIUM LACTATE AND CALCIUM CHLORIDE: 600; 310; 30; 20 INJECTION, SOLUTION INTRAVENOUS at 12:51

## 2021-11-10 RX ADMIN — LIDOCAINE HYDROCHLORIDE 100 MG: 20 INJECTION, SOLUTION INFILTRATION; PERINEURAL at 11:00

## 2021-11-10 RX ADMIN — ALBUTEROL SULFATE 2.5 MG: 2.5 SOLUTION RESPIRATORY (INHALATION) at 10:38

## 2021-11-10 ASSESSMENT — MIFFLIN-ST. JEOR: SCORE: 1873.5

## 2021-11-10 ASSESSMENT — COPD QUESTIONNAIRES
CAT_SEVERITY: MODERATE
COPD: 1

## 2021-11-10 ASSESSMENT — LIFESTYLE VARIABLES: TOBACCO_USE: 1

## 2021-11-10 NOTE — OP NOTE
[facility] 11/10/2021 10:55 AM [address] [place] [address] [phone]     Endoscopy Department   _______________________________________________________________________________   Patient Name: Emmett Omer           Procedure Date: 11/10/2021 10:55 AM   MRN: [id number]                       Account Number: [id number]   YOB: 1950              Admit Type: Outpatient   Age: 71                               Room: OR   Gender: Male                          Note Status: Finalized   Attending MD: Vladimir Gomez MD   Total Sedation Time:   _______________________________________________________________________________       Procedure:             Small bowel enteroscopy   Indications:           Abnormal video capsule endoscopy, Anemia   Providers:             Vladimir Gomez MD, Mariel Palmer MD   Patient Profile:       Mr Omer is a 70yo gentleman struggling with                          progressive anemia and found to have multiple                          arteriovenous malformations on capsule endoscopy (per                          report proximal small bowel) who now proceeds to                          anerograde enteroscopy. A colonoscopy is scheduled to                          follow in light of a colonoscopy months back involving                          removal of a 20mm rectal polyp and tandem AVMs.   Referring MD:          Arpit Graff MD   Medicines:             General Anesthesia   Complications:         No immediate complications.   _______________________________________________________________________________   Procedure:             Pre-Anesthesia Assessment:                          - Prior to the procedure, a History and Physical was                          performed, and patient medications and allergies were                          reviewed. The patient is competent. The risks and                           benefits of the procedure and the sedation options and                          risks were discussed with the patient. All questions                          were answered and informed consent was obtained.                          Patient identification and proposed procedure were                          verified by the nurse in the pre-procedure area.                          Mental Status Examination: alert and oriented. Airway                          Examination: Mallampati Class II (the uvula but not                          tonsillar pillars visualized). Respiratory                          Examination: clear to auscultation. CV Examination:                          systolic murmur. ASA Grade Assessment: III - A patient                          with severe systemic disease. After reviewing the                          risks and benefits, the patient was deemed in                          satisfactory condition to undergo the procedure. The                          anesthesia plan was to use general anesthesia.                          Immediately prior to administration of medications,                          the patient was re-assessed for adequacy to receive                          sedatives. The heart rate, respiratory rate, oxygen                          saturations, blood pressure, adequacy of pulmonary                          ventilation, and response to care were monitored                          throughout the procedure. The physical status of the                          patient was re-assessed after the procedure. After                          obtaining informed consent, the endoscope was passed                          under direct vision. Throughout the procedure, the                          patient's blood pressure, pulse, and oxygen                          saturations were monitored continuously. The pediatric                          gastroscope was introduced through the mouth and                           advanced to the mid-jejunum. The small bowel                          enteroscopy was accomplished without difficulty. The                          patient tolerated the procedure well.                                                                                     Findings:        A pediatric gastroscope was passed to the mid jejunum without issue. The        proximal and mid esophagus were unremarkable. The squamocolumnar line        was irregularly irregular at the gastroesophageal junction and there was        a singe 2cm tongue. The junction was biopsied in four quadrant fashion        (Jar 2) as was the tongue (Jar 1). The stomach was grossly unremarkable        without lesion, though the antrum was mildly erythematous without        bleeding. Multiple arteriovenous malformations with no bleeding were        found throughout all four portions of the duodenum as well as the        proximal jejunum. There were no lesions of the mid jejunum. Fulguration        to ablate the lesions within the duodenum and proximal jejunum by argon        plasma at 0.4 liters/minute and 20 medrano was successful and        uncomplicated.                                                                                     Impression:            - Irregular irregular squamocolumnar line with a 2cm                          salmon tongue suggestive of Barretts and biopsied (GEJ                          and tongue) (if Barretts C0M2)                          - Grossly no lesions of the stomach though the antrum                          was erythematous                          - Multiple nonbleeding arteriovenous malformations of                          various sizes were found in the duodenum and jejunum                          and were prophylactivally ablated with low flow low                          watage argon plasma with appropriate effect                          - As the lesons ceased in the  proximal jejunum and we                          traversed to the mid jejunum without subsequent                          lesion, a decision was made to not pursue balloon                          assisted enteroscopy   Recommendation:        - Proceed to colonoscopy as planned                          - Continue to monitor hemoglobin; with ongoing                          evidence of gastrointestinal bleeding, [facility] (as we still have not                          seen the imaging from the original capsule)                          - All medications, diete and activity may resume on                          discharge (though antithrombotics should be held for                          three days given multiple large polypectomies)                          - The findings and recommendations were discussed with                          the patient and their family                                                                                       electronically signed by JOSE E Gomez

## 2021-11-10 NOTE — DISCHARGE INSTRUCTIONS
Recommendation:                                                       - Hold antrithrombotics for at least three days; all other medications may resume without delay, as may a diet and acitivity                          - Follow up with your established providers, bowel preparation was less than ideal and given the multiple greater than 10mm polyps found and removed, repeat colonoscopy with extended bowel preparaton locally in 6 months                          - Continue to monitor hemoglobin; with ongoing evidence of gastrointestinal bleeding, repeat capsule endoscopy here at the [facility] (as we still have not seen the imaging from the original capsule)                          - All medications, diet and activity may resume on discharge (though antithrombotics should be held for three days given multiple large polypectomies)                       [facility], [place]  Same-Day Surgery   Adult Discharge Orders & Instructions     For 24 hours after surgery    1. Get plenty of rest.  A responsible adult must stay with you for at least 24 hours after you leave the hospital.   2. Do not drive or use heavy equipment.  If you have weakness or tingling, don't drive or use heavy equipment until this feeling goes away.  3. Do not drink alcohol.  4. Avoid strenuous or risky activities.  Ask for help when climbing stairs.   5. You may feel lightheaded.  IF so, sit for a few minutes before standing.  Have someone help you get up.   6. If you have nausea (feel sick to your stomach): Drink only clear liquids such as apple juice, ginger ale, broth or 7-Up.  Rest may also help.  Be sure to drink enough fluids.  Move to a regular diet as you feel able.  7. You may have a slight fever. Call the doctor if your fever is over 100 F (37.7 C) (taken under the tongue) or lasts longer than 24 hours.  8. You may have a dry mouth, a sore throat, muscle aches or trouble sleeping.  These should go away after 24  hours.  9. Do not make important or legal decisions.   Call your doctor for any of the followin.  Signs of infection (fever, growing tenderness at the surgery site, a large amount of drainage or bleeding, severe pain, foul-smelling drainage, redness, swelling).    2. It has been over 8 to 10 hours since surgery and you are still not able to urinate (pass water).    3.  Headache for over 24 hours.    4.  Numbness, tingling or weakness the day after surgery (if you had spinal anesthesia).  To contact a doctor, call Dr Gomez at [phone] (General Surgery clinic)   or:        [phone] and ask for the resident on call for   Gastroenterology (answered 24 hours a day)      Emergency Department:    [facility]: [phone]       (TTY for hearing impaired: [phone])

## 2021-11-10 NOTE — ANESTHESIA PROCEDURE NOTES
Airway       Patient location during procedure: OR  Staff -        CRNA: Soraya Brian APRN CRNA       Performed By: CRNA  Consent for Airway        Urgency: elective  Indications and Patient Condition       Indications for airway management: maurice-procedural       Induction type:intravenous       Mask difficulty assessment: 1 - vent by mask    Final Airway Details       Final airway type: endotracheal airway       Successful airway: ETT - single  Endotracheal Airway Details        ETT size (mm): 7.5       Cuffed: yes       Successful intubation technique: direct laryngoscopy       DL Blade Type: Rodriguez 2       Grade View of Cords: 2       Adjucts: stylet       Position: Right       Measured from: lips       Secured at (cm): 23       Bite block used: Soft    Post intubation assessment        Placement verified by: capnometry, equal breath sounds and chest rise        Number of attempts at approach: 1       Secured with: pink tape       Ease of procedure: easy       Dentition: Unchanged, Intact and Lips/oral mucosa injury

## 2021-11-10 NOTE — ANESTHESIA PREPROCEDURE EVALUATION
Anesthesia Pre-Procedure Evaluation    Patient: Emmett Omer   MRN: [id number] : 1950        Preoperative Diagnosis: GIB (gastrointestinal bleeding) [K92.2]    Procedure : Procedure(s):  ENTEROSCOPY  COLONOSCOPY          Past Medical History:   Diagnosis Date     Hypertension       Past Surgical History:   Procedure Laterality Date     COLONOSCOPY        Allergies   Allergen Reactions     Bee Venom Anaphylaxis     Levofloxacin Shortness Of Breath and Hives     Lisinopril Cough      Social History     Tobacco Use     Smoking status: Current Every Day Smoker     Types: Cigarettes     Smokeless tobacco: Never Used   Substance Use Topics     Alcohol use: Not Currently      Wt Readings from Last 1 Encounters:   11/10/21 115.4 kg (254 lb 6.6 oz)        Anesthesia Evaluation   Pt has not had prior anesthetic         ROS/MED HX  ENT/Pulmonary:     (+) sleep apnea, doesn't use CPAP, BEBE risk factors, snores loudly, hypertension, obese, tobacco use, Current use, moderate,  COPD,     Neurologic:  - neg neurologic ROS     Cardiovascular:       METS/Exercise Tolerance:     Hematologic:     (+) anemia, history of blood transfusion, no previous transfusion reaction, Known PRBC Anitbodies:No     Musculoskeletal:   (+) arthritis,     GI/Hepatic:  - neg GI/hepatic ROS     Renal/Genitourinary:  - neg Renal ROS     Endo:  - neg endo ROS     Psychiatric/Substance Use:  - neg psychiatric ROS     Infectious Disease:  - neg infectious disease ROS     Malignancy:       Other:            Physical Exam    Airway        Mallampati: II   TM distance: > 3 FB   Neck ROM: full   Mouth opening: > 3 cm    Respiratory Devices and Support         Dental       (+) upper dentures and lower dentures      Cardiovascular   cardiovascular exam normal       Rhythm and rate: regular and normal     Pulmonary   pulmonary exam normal        breath sounds clear to auscultation           OUTSIDE LABS:  CBC: No results found for: WBC, HGB, HCT, PLT  BMP:    Lab Results   Component Value Date     (H) 11/10/2021     COAGS: No results found for: PTT, INR, FIBR  POC: No results found for: BGM, HCG, HCGS  HEPATIC: No results found for: ALBUMIN, PROTTOTAL, ALT, AST, GGT, ALKPHOS, BILITOTAL, BILIDIRECT, SHIVAM  OTHER: No results found for: PH, LACT, A1C, FREDERICK, PHOS, MAG, LIPASE, AMYLASE, TSH, T4, T3, CRP, SED    Anesthesia Plan    ASA Status:  3   NPO Status:  NPO Appropriate    Anesthesia Type: General.     - Airway: ETT   Induction: Intravenous, Propofol.   Maintenance: Balanced.        Consents    Anesthesia Plan(s) and associated risks, benefits, and realistic alternatives discussed. Questions answered and patient/representative(s) expressed understanding.     - Discussed with:  Patient      - Extended Intubation/Ventilatory Support Discussed: No.      - Patient is DNR/DNI Status: No    Use of blood products discussed: Yes.     - Discussed with: Patient.     - Consented: consented to blood products            Reason for refusal: other.     Postoperative Care    Pain management: IV analgesics.   PONV prophylaxis: Ondansetron (or other 5HT-3)     Comments:                Berny Jarquin MD

## 2021-11-10 NOTE — ANESTHESIA CARE TRANSFER NOTE
Patient: Emmett Omer    Procedure: Procedure(s):  ENTEROSCOPY WITH tissue ablation, biopsies  COLONOSCOPY, polypectomy, tissue abalation       Diagnosis: GIB (gastrointestinal bleeding) [K92.2]  Diagnosis Additional Information: No value filed.    Anesthesia Type:   General     Note:    Oropharynx: oropharynx clear of all foreign objects and spontaneously breathing  Level of Consciousness: awake  Oxygen Supplementation: face mask  Level of Supplemental Oxygen (L/min / FiO2): 5  Independent Airway: airway patency satisfactory and stable  Dentition: dentition unchanged  Vital Signs Stable: post-procedure vital signs reviewed and stable  Report to RN Given: handoff report given  Patient transferred to: PACU    Handoff Report: Identifed the Patient, Identified the Reponsible Provider, Reviewed the pertinent medical history, Discussed the surgical course, Reviewed Intra-OP anesthesia mangement and issues during anesthesia, Set expectations for post-procedure period and Allowed opportunity for questions and acknowledgement of understanding      Vitals:  Vitals Value Taken Time   BP     Temp     Pulse     Resp     SpO2         Electronically Signed By: BLANCA Johnson CRNA  November 10, 2021  12:52 PM

## 2021-11-10 NOTE — OP NOTE
COLONOSCOPY 11/10/2021 10:56 AM [address] [place] [address] [phone]     Endoscopy Department   _______________________________________________________________________________   Patient Name: Emmett Omer           Procedure Date: 11/10/2021 10:56 AM   MRN: [id number]                       Account Number: [id number]   YOB: 1950              Admit Type: Outpatient   Age: 71                               Room: OR   Gender: Male                          Note Status: Finalized   Attending MD: Vladimir Gomez MD   Total Sedation Time:   _______________________________________________________________________________       Procedure:             Colonoscopy   Indications:           High risk colon cancer surveillance: Personal history                          of adenoma (10 mm or greater in size)   Providers:             Vladimir Gomez MD, Mariel Palmer MD   Patient Profile:       Mr Omer is a 70yo gentleman with ongoing anemia                          throught secondary to bleeding AVMs who proceeds to                          colonoscopy in the setting of recent >20mm polypectomy                          (rectal) and known AVMs.   Referring MD:          Arpit Graff MD   Medicines:             General Anesthesia   Complications:         No immediate complications.   _______________________________________________________________________________   Procedure:             Pre-Anesthesia Assessment:                          - Prior to the procedure, a History and Physical was                          performed, and patient medications and allergies were                          reviewed. The patient is competent. The risks and                          benefits of the procedure and the sedation options and                          risks were discussed with the patient. All questions                          were answered and  informed consent was obtained.                          Patient identification and proposed procedure were                          verified by the nurse in the pre-procedure area.                          Mental Status Examination: alert and oriented. Airway                          Examination: Mallampati Class II (the uvula but not                          tonsillar pillars visualized). Respiratory                          Examination: clear to auscultation. CV Examination:                          systolic murmur. ASA Grade Assessment: III - A patient                          with severe systemic disease. After reviewing the                          risks and benefits, the patient was deemed in                          satisfactory condition to undergo the procedure. The                          anesthesia plan was to use general anesthesia.                          Immediately prior to administration of medications,                          the patient was re-assessed for adequacy to receive                          sedatives. The heart rate, respiratory rate, oxygen                          saturations, blood pressure, adequacy of pulmonary                          ventilation, and response to care were monitored                          throughout the procedure. The physical status of the                          patient was re-assessed after the procedure. After                          obtaining informed consent, the colonoscope was passed                          under direct vision. Throughout the procedure, the                          patient's blood pressure, pulse, and oxygen                          saturations were monitored continuously. The pediatric                          colonoscope was introduced through the anus and                          advanced to the ileocecal valve. The colonoscopy was                          performed without difficulty. The patient tolerated                           the procedure well. The quality of the bowel                          preparation was adequate to identify polyps 6 mm and                          larger in size.                                                                                     Findings:        Following the enteroscopy, digital examination demonstrated intact tone        with a nonbleeding hemorrhoid. The pediatric colonoscope was advanced to        the cecum without issue. The bowel preparation was poor though adequate        to see larger lesions (>6mm). Multiple sessile polys were identified and        removed complete by cold and or hot snare technqiue, with two 5mm        sessile polyps in the cecum, one 20mm sessile polyp at the hepatic        flexure, two 15mm sessile polyps in the desceding and one 15mm polyp in        the sigmoid colon. Resection and retrieval were complete. Two larger,        non bleeding AVMs were also identified, one in the cecum and one at the        hepatic flexure. Both were proactively ablated using APC (20W low flow).                                                                                     Impression:            - Two nonbleeding AVMs were identified and ablated by                          APC; one in the cecum and one at the hepatic flexure                          - Multiple sessile polys were identified and removed                          complete by cold and or hot snare technqiue, with two                          5mm sessile polyps in the cecum, one 20mm sessile                          polyp at the hepatic flexure, two 15mm sessile polyps                          in the desceding and one 15mm polyp in the sigmoid                          colon                          - No evidence of residual polyp at the previous                          resection site in the rectum   Recommendation:        - General anesthesia recovery with probable discharge                          home this afternoon                           - Hold antrithrombotics for at least three days; all                          other medications may resume without delay, as may a                          diet and acitivity                          - Follow up with your established providers, bowel                          preparation was less than ideal and given the multiple                          >10mm polyps found and removed, repeat colonoscopy                          with extended bowel preparaton locally in 6m                          - Refer to tandem anterograde enteroscopy note during                          which multiple duodenal and jejunal AVMs were ablated                          by APC                          - The findings and recommendations were discussed with                          the patient and their family                                                                                       electronically signed by JOSE E Gomez

## 2021-11-10 NOTE — LETTER
Patient:  Emmett Omer  :   1950  MRN:     [id number]        Mr.Jeffrey CRESCENCIO Omer  [address] [facility] [address]        2021    Dear ,    We are writing to inform you of your test results. In short, the polyps removed from your colkon returned without evidence of dysplasia or cancer. However given the number and size of these in combination with your poor bowel preparation, you will want to repeat a colonoscopy following an extended bowel preparation in 6 months locally. Moreover, the sampling of your esophagus returned consistent with Weeks's esophagus. As discussed previously, this is an early step in a pathway towards cancer. However, you had no lesions or dysplasia, and therefore you should have your esophagus looked at and sampled again by upper endoscopy in one year. This can also happen locally.    I have included the formal documtentation of the results below. It continues to be a pleasure participating in your care.  Please feel free to contact our clinic with any further questions.      Sincerely,    Vladimir Gomez MD PhD FACG RICHARDSON LONDONO  Director of Endoscopy  Associate Professor of Medicine, Surgery and Pediatrics  Interventional and Therapeutic Endoscopy    [facility]  Division of Gastroenterology and Hepatology  [facility] [address] [address]    New Consultations  [phone]  Procedure Scheduling [phone]  Clinical Nurse Coordinator [phone]  Clinical Fax   [phone]  Administrative   [phone]  Administrative Fax  [phone]        Resulted Orders   Surgical Pathology Exam   Result Value Ref Range    Case Report       Surgical Pathology Report                         Case: [id number]                                  Authorizing Provider:  Vladimir Gomez MD  Collected:           11/10/2021 11:32 AM          Ordering Location:      MAIN OR                 Received:        "     11/10/2021 01:16 PM          Pathologist:           Louis Lincoln MD                                                                 Specimens:   A) - Gastric Esophageal Junction, salmon tongue 41 cm - 43 cm                                       B) - Gastric Esophageal Junction, 4 quadrant biopsies                                               C) - Large Intestine, Colon, Cecum, Cecal polyp                                                     D) - Large Intestine, Colon, Hepatic Flexure, Polyps hepatic flexure                                E) - Large Intestine, Colon, Descending, Decending colon/ sigmoid  polyps                  Final Diagnosis       A. GASTRIC ESOPHAGEAL JUNCTION,41 CM - 43 CM, BIOPSIES:  - Squamous-columnar epithelium with small intestinal metaplasia, consistent with Weeks's esophagus  - Negative for dysplasia    B. GASTROESOPHAGEAL JUNCTION BIOPSIES:  - Esophageal squamous mucosa with features of reflux  - Negative for intestinal metaplasia and dysplasia    C. CECAL POLYP, POLYPECTOMY:  -Tubular adenoma, fragmented  -Negative for high-grade dysplasia    D.  COLON POLYPS, HEPATIC FLEXURE, POLYPECTOMY:  -Tubular adenomas, fragmented  -Negative for high-grade dysplasia    E. DESCENDING COLON/ SIGMOID POLYPS, POLYPECTOMY:  -Tubular adenomas, fragmented  -Negative for high-grade dysplasia        Clinical Information       History of AVMs, colonic adenomas      Gross Description       A(1). Gastric Esophageal Junction, salmon tongue 41 cm - 43 cm:  The specimen is received in formalin with proper patient identification, labeled \"salmon tongue 41 cm - 43 cm\".  The specimen consists of 4 pieces of pink-tan soft tissue ranging in size from 0.1 to 0.2 cm in greatest dimension, which are wrapped and entirely submitted in cassette A1.     B(2). Gastric Esophageal Junction, 4 quadrant biopsies:  The specimen " "is received in formalin with proper patient identification, labeled \"four-quadrant biopsies\".  The specimen consists of 3 pieces of pink-tan soft tissue ranging in size from 0.1 to 0.2 cm in greatest dimension, which are wrapped and entirely submitted in cassette B1.     C(3). Large Intestine, Colon, Cecum, Cecal polyp:  The specimen is received in formalin with proper patient identification, labeled \"cecal polyp\".  The specimen consists of 7 pieces of pink-tan soft tissue ranging in size from 0.1 to 0.2 cm in greatest dimension, which are entirely submitted in cassette C1.     D(4). Large Intestine, Colon, Hepatic Flexure, Polyps hepatic flexure:  The specimen is received in formalin with proper patient identification, labeled \"polyp hepatic flexure\".  The specimen consists of a 1.2 x 1.0 x 0.1 cm aggregate of red-brown fragmented soft tissue, which is entirely submitted in cassettes D1-D2.     E(5). Large Intestine, Colon, Descending, Decending colon/ sigmoid  polyps:  The specimen is received in formalin with proper patient identification, labeled \"descending colon/sigmoid polyps\".  The specimen consists of 4 pieces of red-brown soft tissue ranging in size from 0.5 to 1.0 cm in greatest dimension.  The specimen is sectioned as necessary and entirely submitted in cassettes E1-E2.         Microscopic Description       Microscopic performed      Performing Labs       The technical component of this testing was completed at [facility]      Case Images             "

## 2021-11-10 NOTE — ANESTHESIA POSTPROCEDURE EVALUATION
Patient: Emmett Omer    Procedure: Procedure(s):  ENTEROSCOPY WITH tissue ablation, biopsies  COLONOSCOPY, polypectomy, tissue abalation       Diagnosis:GIB (gastrointestinal bleeding) [K92.2]  Diagnosis Additional Information: No value filed.    Anesthesia Type:  General    Note:  Disposition: Outpatient   Postop Pain Control: Uneventful            Sign Out: Well controlled pain   PONV: No   Neuro/Psych: Uneventful            Sign Out: Acceptable/Baseline neuro status   Airway/Respiratory: Uneventful            Sign Out: Acceptable/Baseline resp. status   CV/Hemodynamics: Uneventful            Sign Out: Acceptable CV status; No obvious hypovolemia; No obvious fluid overload   Other NRE: NONE   DID A NON-ROUTINE EVENT OCCUR? No           Last vitals:  Vitals Value Taken Time   /75 11/10/21 1330   Temp 36.1  C (97  F) 11/10/21 1330   Pulse 73 11/10/21 1335   Resp 16 11/10/21 1330   SpO2 99 % 11/10/21 1335   Vitals shown include unvalidated device data.    Electronically Signed By: Terry Santoyo DO  November 10, 2021  1:37 PM

## 2021-11-11 LAB
PATH REPORT.COMMENTS IMP SPEC: NORMAL
PATH REPORT.COMMENTS IMP SPEC: NORMAL
PATH REPORT.FINAL DX SPEC: NORMAL
PATH REPORT.GROSS SPEC: NORMAL
PATH REPORT.MICROSCOPIC SPEC OTHER STN: NORMAL
PATH REPORT.RELEVANT HX SPEC: NORMAL
PHOTO IMAGE: NORMAL

## 2021-11-15 ENCOUNTER — TELEPHONE (OUTPATIENT)
Dept: GASTROENTEROLOGY | Facility: CLINIC | Age: 71
End: 2021-11-15
Payer: MEDICAID

## 2021-11-15 NOTE — TELEPHONE ENCOUNTER
[facility] Call Center    Phone Message    May a detailed message be left on voicemail: yes     Reason for Call: Other:     Sohail is calling to discuss his diet options since his surgery on 11/10/2021.    He said he is feeling better but just wants to make sure he is eating what he should be eating.  Please call back to discuss.      Action Taken: Message routed to:  [facility]): pan bili    Travel Screening: Not Applicable

## 2021-11-15 NOTE — TELEPHONE ENCOUNTER
Post enteroscopy/colonoscopy (11-20-21) with Dr. Gomez: Follow-up    Post procedure recommendations:     Follow up with your established providers, bowel  preparation was less than ideal and given the multiple   >10mm polyps found and removed, repeat colonoscopy  with extended bowel preparaton locally in 6m     Continue to monitor hemoglobin; with ongoing  evidence of gastrointestinal bleeding, [facility] (as [facility] still have not seen the imaging from the original capsule)     Orders placed: none, reports routed to PCP    Patient states: pt wants to know if he can go back to full diet, advised that he can- pt understands follow up plan.    Clinic contact and scheduling numbers verified for future questions/concerns.    Michelle George, RN Care Coordinator

## 2021-11-29 ENCOUNTER — TELEPHONE (OUTPATIENT)
Dept: GASTROENTEROLOGY | Facility: CLINIC | Age: 71
End: 2021-11-29
Payer: MEDICAID

## 2021-11-29 NOTE — TELEPHONE ENCOUNTER
Called Emmett to check-in. He states he is doing great. His local PCP is following him closely and he does not need anything from us right now. His Hgb is improving (9.2 at last check). He is aware he needs another colonoscopy in 5-6 months, which he will get locally, unless they think he is bleeding again. He understands he should call us if he notices his Hgb dropping again or is in the state of [place] for follow-up. He is unable to make the 12/7 video visit with Joelle given the long drive. He understands to call us with questions/concerns/dropping Hgb otherwise is ok to follow-up locally. Will cancel the 12/7 visit.    Writer's phone number and name was provided to patient for contact should he need anything from [facility] again in the future. He was appreciative of the call.

## (undated) DEVICE — KIT CONNECTOR FOR OLYMPUS ENDOSCOPES DEFENDO 100310

## (undated) DEVICE — ENDO CAP AND TUBING STERILE FOR ENDOGATOR  100130

## (undated) DEVICE — DEVICE RETRIEVAL ROTH NET PLATINUM UNIV 2.5MMX230CM 00715050

## (undated) DEVICE — ENDO TUBING CO2 SMARTCAP STERILE DISP 100145CO2EXT

## (undated) DEVICE — NDL SCLEROTHERAPY 25GA CARR-LOCK  00711811

## (undated) DEVICE — ESU PROBE CIRCUMFERENTIAL FIRE FLEX 2.3MMX220CM 20132-218

## (undated) DEVICE — ENDO FORCEP ENDOJAW BIOPSY 2.8MMX230CM FB-220U

## (undated) DEVICE — ENDO SNARE EXACTO COLD 9MM LOOP 2.4MMX230CM 00711115

## (undated) DEVICE — SNARE CAPTIVATOR II POLYPECTOMY 20X240MM M00561242

## (undated) DEVICE — SUCTION MANIFOLD NEPTUNE 2 SYS 4 PORT 0702-020-000

## (undated) DEVICE — KIT ENDO FIRST STEP DISINFECTANT 200ML W/POUCH EP-4

## (undated) DEVICE — ENDO SUCTION TRAP POLYP 4 CHAMBER H334

## (undated) DEVICE — PACK ENDOSCOPY GI CUSTOM UMMC

## (undated) DEVICE — SOL WATER IRRIG 1000ML BOTTLE 2F7114

## (undated) DEVICE — ENDO BITE BLOCK ADULT OMNI-BLOC

## (undated) RX ORDER — FENTANYL CITRATE-0.9 % NACL/PF 10 MCG/ML
PLASTIC BAG, INJECTION (ML) INTRAVENOUS
Status: DISPENSED
Start: 2021-11-10

## (undated) RX ORDER — FENTANYL CITRATE 50 UG/ML
INJECTION, SOLUTION INTRAMUSCULAR; INTRAVENOUS
Status: DISPENSED
Start: 2021-11-10

## (undated) RX ORDER — ALBUTEROL SULFATE 0.83 MG/ML
SOLUTION RESPIRATORY (INHALATION)
Status: DISPENSED
Start: 2021-11-10

## (undated) RX ORDER — ROCURONIUM BROMIDE 50 MG/5 ML
SYRINGE (ML) INTRAVENOUS
Status: DISPENSED
Start: 2021-11-10

## (undated) RX ORDER — PROPOFOL 10 MG/ML
INJECTION, EMULSION INTRAVENOUS
Status: DISPENSED
Start: 2021-11-10

## (undated) RX ORDER — EPHEDRINE SULFATE 50 MG/ML
INJECTION, SOLUTION INTRAMUSCULAR; INTRAVENOUS; SUBCUTANEOUS
Status: DISPENSED
Start: 2021-11-10